# Patient Record
Sex: MALE | Race: WHITE | NOT HISPANIC OR LATINO | Employment: OTHER | URBAN - METROPOLITAN AREA
[De-identification: names, ages, dates, MRNs, and addresses within clinical notes are randomized per-mention and may not be internally consistent; named-entity substitution may affect disease eponyms.]

---

## 2017-10-18 ENCOUNTER — PREPPED CHART (OUTPATIENT)
Dept: URBAN - METROPOLITAN AREA CLINIC 43 | Facility: CLINIC | Age: 72
End: 2017-10-18

## 2018-01-16 NOTE — PATIENT DISCUSSION
Discussed possibility of fluid retention can be secondary to heart not working properly,pt did have stress related heart attack in 2007. Recommend exploring environmental(allergens) changes in detergents,plants, make up ect. Advised patient oral antihistamine can dry eye out eyes.

## 2018-01-16 NOTE — PATIENT DISCUSSION
(H10.45) Other chronic allergic conjunctivitis - Assesment : Other chronic allergic conjunctivitis to environmental and sleeping position. Discussed possibility of fluid retention can be secondary to heart not working properly,pt did have stress related heart attack in 2007. Recommend exploring environmental(allergens) changes in detergents,plants, make up ect. Advised patient oral antihistamine can dry eye out eyes. - Plan : Monitor for changes.  Continue cold compresses Stop Visine-A, recommend PF artificial tears 3-4 times a day Start OTC Zaditor or Alaway BID/PRN OU 2-3 weeks Rtc 1 month follow up

## 2018-01-16 NOTE — PATIENT DISCUSSION
Monitor for changes. Continue cold compressesStop Visine-A, recommend PF artificial tears 3-4 times a dayStart OTC Zaditor or Alaway BID/PRN OU 2-3 weeks.

## 2018-04-30 ENCOUNTER — ESTABLISHED PATIENT (OUTPATIENT)
Dept: URBAN - METROPOLITAN AREA CLINIC 43 | Facility: CLINIC | Age: 73
End: 2018-04-30

## 2018-04-30 DIAGNOSIS — H43.811: ICD-10-CM

## 2018-04-30 DIAGNOSIS — H35.372: ICD-10-CM

## 2018-04-30 DIAGNOSIS — H35.033: ICD-10-CM

## 2018-04-30 DIAGNOSIS — H35.30: ICD-10-CM

## 2018-04-30 PROCEDURE — G8427 DOCREV CUR MEDS BY ELIG CLIN: HCPCS

## 2018-04-30 PROCEDURE — 92134 CPTRZ OPH DX IMG PST SGM RTA: CPT

## 2018-04-30 PROCEDURE — 4177F TALK PT/CRGVR RE AREDS PREV: CPT

## 2018-04-30 PROCEDURE — 2019F DILATED MACUL EXAM DONE: CPT

## 2018-04-30 PROCEDURE — 9222650 BILAT EXTENDED OPHTHALMOSCOPY, F/U

## 2018-04-30 PROCEDURE — 92012 INTRM OPH EXAM EST PATIENT: CPT

## 2018-04-30 ASSESSMENT — TONOMETRY
OS_IOP_MMHG: 12
OD_IOP_MMHG: 16

## 2018-04-30 ASSESSMENT — VISUAL ACUITY
OD_CC: 20/20
OS_CC: 20/50+2

## 2018-09-20 ENCOUNTER — APPOINTMENT (RX ONLY)
Dept: URBAN - METROPOLITAN AREA CLINIC 343 | Facility: CLINIC | Age: 73
Setting detail: DERMATOLOGY
End: 2018-09-20

## 2018-09-20 DIAGNOSIS — D18.0 HEMANGIOMA: ICD-10-CM

## 2018-09-20 DIAGNOSIS — L21.8 OTHER SEBORRHEIC DERMATITIS: ICD-10-CM

## 2018-09-20 DIAGNOSIS — L81.4 OTHER MELANIN HYPERPIGMENTATION: ICD-10-CM

## 2018-09-20 DIAGNOSIS — D22 MELANOCYTIC NEVI: ICD-10-CM

## 2018-09-20 DIAGNOSIS — L82.1 OTHER SEBORRHEIC KERATOSIS: ICD-10-CM

## 2018-09-20 PROBLEM — E78.5 HYPERLIPIDEMIA, UNSPECIFIED: Status: ACTIVE | Noted: 2018-09-20

## 2018-09-20 PROBLEM — D18.01 HEMANGIOMA OF SKIN AND SUBCUTANEOUS TISSUE: Status: ACTIVE | Noted: 2018-09-20

## 2018-09-20 PROBLEM — D22.5 MELANOCYTIC NEVI OF TRUNK: Status: ACTIVE | Noted: 2018-09-20

## 2018-09-20 PROBLEM — K21.9 GASTRO-ESOPHAGEAL REFLUX DISEASE WITHOUT ESOPHAGITIS: Status: ACTIVE | Noted: 2018-09-20

## 2018-09-20 PROBLEM — D48.5 NEOPLASM OF UNCERTAIN BEHAVIOR OF SKIN: Status: ACTIVE | Noted: 2018-09-20

## 2018-09-20 PROCEDURE — 11100: CPT

## 2018-09-20 PROCEDURE — ? PRESCRIPTION

## 2018-09-20 PROCEDURE — ? TREATMENT REGIMEN

## 2018-09-20 PROCEDURE — 99203 OFFICE O/P NEW LOW 30 MIN: CPT | Mod: 25

## 2018-09-20 PROCEDURE — ? BIOPSY BY SHAVE METHOD

## 2018-09-20 PROCEDURE — ? COUNSELING

## 2018-09-20 RX ORDER — IODOQUINOL, HYDROCORTISONE ACETATE AND ALOE VERA LEAF 10; 20; 10 MG/G; MG/G; MG/G
GEL TOPICAL QD
Qty: 1 | Refills: 0 | Status: ERX | COMMUNITY
Start: 2018-09-20

## 2018-09-20 RX ADMIN — IODOQUINOL, HYDROCORTISONE ACETATE AND ALOE VERA LEAF: 10; 20; 10 GEL TOPICAL at 17:22

## 2018-09-20 ASSESSMENT — LOCATION DETAILED DESCRIPTION DERM
LOCATION DETAILED: RIGHT SUPERIOR MEDIAL MIDBACK
LOCATION DETAILED: LEFT CENTRAL MALAR CHEEK
LOCATION DETAILED: RIGHT SUPERIOR UPPER BACK
LOCATION DETAILED: RIGHT INFERIOR UPPER BACK
LOCATION DETAILED: RIGHT MID-UPPER BACK

## 2018-09-20 ASSESSMENT — LOCATION ZONE DERM
LOCATION ZONE: FACE
LOCATION ZONE: TRUNK

## 2018-09-20 ASSESSMENT — LOCATION SIMPLE DESCRIPTION DERM
LOCATION SIMPLE: LEFT CHEEK
LOCATION SIMPLE: RIGHT UPPER BACK
LOCATION SIMPLE: RIGHT LOWER BACK

## 2018-09-20 NOTE — PROCEDURE: TREATMENT REGIMEN
Plan: Patient is to contact the office if any problems occur for further evaluation and management
Initiate Treatment: Applying Alcortin A Gel once a day to face as needed for flare ups
Detail Level: Zone

## 2019-04-16 NOTE — PATIENT DISCUSSION
(H25.13) Age-related nuclear cataract, bilateral - Assesment : Examination revealed cataract. - Plan : Monitor for changes. Advised patient to call our office with decreased vision or increased symptoms. Subjective:

## 2019-04-24 ENCOUNTER — ESTABLISHED COMPREHENSIVE EXAM (OUTPATIENT)
Dept: URBAN - METROPOLITAN AREA CLINIC 43 | Facility: CLINIC | Age: 74
End: 2019-04-24

## 2019-04-24 DIAGNOSIS — H43.811: ICD-10-CM

## 2019-04-24 DIAGNOSIS — H35.372: ICD-10-CM

## 2019-04-24 DIAGNOSIS — H35.033: ICD-10-CM

## 2019-04-24 DIAGNOSIS — H35.30: ICD-10-CM

## 2019-04-24 PROCEDURE — 92250 FUNDUS PHOTOGRAPHY W/I&R: CPT

## 2019-04-24 PROCEDURE — 92134 CPTRZ OPH DX IMG PST SGM RTA: CPT

## 2019-04-24 PROCEDURE — 92235 FLUORESCEIN ANGRPH MLTIFRAME: CPT

## 2019-04-24 PROCEDURE — 92014 COMPRE OPH EXAM EST PT 1/>: CPT

## 2019-04-24 PROCEDURE — 9222650 BILAT EXTENDED OPHTHALMOSCOPY, F/U

## 2019-04-24 ASSESSMENT — VISUAL ACUITY
OD_CC: 20/20-1
OS_CC: 20/30-1

## 2019-04-24 ASSESSMENT — TONOMETRY
OS_IOP_MMHG: 11
OD_IOP_MMHG: 15

## 2019-09-19 ENCOUNTER — APPOINTMENT (RX ONLY)
Dept: URBAN - METROPOLITAN AREA CLINIC 153 | Facility: CLINIC | Age: 74
Setting detail: DERMATOLOGY
End: 2019-09-19

## 2019-09-19 DIAGNOSIS — L81.4 OTHER MELANIN HYPERPIGMENTATION: ICD-10-CM

## 2019-09-19 DIAGNOSIS — D22 MELANOCYTIC NEVI: ICD-10-CM

## 2019-09-19 DIAGNOSIS — L82.1 OTHER SEBORRHEIC KERATOSIS: ICD-10-CM

## 2019-09-19 DIAGNOSIS — D18.0 HEMANGIOMA: ICD-10-CM

## 2019-09-19 PROBLEM — E78.5 HYPERLIPIDEMIA, UNSPECIFIED: Status: ACTIVE | Noted: 2019-09-19

## 2019-09-19 PROBLEM — K21.9 GASTRO-ESOPHAGEAL REFLUX DISEASE WITHOUT ESOPHAGITIS: Status: ACTIVE | Noted: 2019-09-19

## 2019-09-19 PROBLEM — I10 ESSENTIAL (PRIMARY) HYPERTENSION: Status: ACTIVE | Noted: 2019-09-19

## 2019-09-19 PROBLEM — D22.5 MELANOCYTIC NEVI OF TRUNK: Status: ACTIVE | Noted: 2019-09-19

## 2019-09-19 PROBLEM — D18.01 HEMANGIOMA OF SKIN AND SUBCUTANEOUS TISSUE: Status: ACTIVE | Noted: 2019-09-19

## 2019-09-19 PROCEDURE — ? COUNSELING

## 2019-09-19 PROCEDURE — 99214 OFFICE O/P EST MOD 30 MIN: CPT

## 2019-09-19 ASSESSMENT — LOCATION SIMPLE DESCRIPTION DERM
LOCATION SIMPLE: UPPER BACK
LOCATION SIMPLE: LEFT UPPER BACK

## 2019-09-19 ASSESSMENT — LOCATION ZONE DERM: LOCATION ZONE: TRUNK

## 2019-09-19 ASSESSMENT — LOCATION DETAILED DESCRIPTION DERM
LOCATION DETAILED: SUPERIOR THORACIC SPINE
LOCATION DETAILED: LEFT SUPERIOR MEDIAL UPPER BACK

## 2020-06-25 NOTE — PATIENT DISCUSSION
Risks, Benefits and Alternatives were discussed with patient at length for Cataract Surgery. Visual symptoms are consistent with Cataract findings on examination and current refraction no longer provides satisfactory vision. Patient understands and would like to think about it prior to scheduling cataract sx. All questions answered. Risks, Benefits and Alternatives discussed at length for IOL placement. Doctor suggests Custom\Toric Pkg. Patient desires Custom\Toric Pkg. Patient will need to wear glasses for best corrected vision at near.

## 2023-07-31 NOTE — PATIENT DISCUSSION
RECOMMENDED PACKAGE: Custom\Toric Pkg.
Advised to use OTC +2.00/+. 250 depending on where reading material is held.
Discussed possibility of fluid retention can be secondary to heart not working properly,pt did have stress related heart attack in 2007. Recommend exploring environmental(allergens) changes in detergents,plants, make up ect. Advised patient oral antihistamine can dry eye out eyes.
EYE:  OS.
How Severe Are Your Spot(S)?: mild
IOL TYPE: Custom\Toric Pkg.
Mild corneal edema still present. Otherwise good postoperative appearance.
Monitor for changes. Continue cold compressesStop Visine-A, recommend PF artificial tears 3-4 times a dayStart OTC Zaditor or Alaway BID/PRN OU 2-3 weeks.
Monitor.
Observe.
POST OPERATIVE T[employer]: [place].
PT PREFERRED PACKAGE: Custom\Ravinder Dubon.
Patient to see surgery counselor today.
Post op instructions reviewed with patients including the use of drops.
RTC 1 year\EXAM.
RTC 3 week post op.
Rare guttata OU.
Recommended observation.
Risks, Benefits and Alternatives were discussed with patient at length for Cataract Surgery. Visual symptoms are consistent with Cataract findings on examination and current refraction no longer provides satisfactory vision. Patient understands and would like to think about it prior to scheduling cataract sx. All questions answered. Risks, Benefits and Alternatives discussed at length for IOL placement. Doctor suggests Custom\Toric Pkg. Patient desires Custom\Toric Pkg. Patient will need to wear glasses for best corrected vision at near.
Rtc 1 month follow up.
See plan # 1.
Visual acuity is at potential. Good postoperative appearance.
Have Your Spot(S) Been Treated In The Past?: has not been treated
Hpi Title: Evaluation of Skin Lesions